# Patient Record
Sex: MALE | Employment: FULL TIME | URBAN - METROPOLITAN AREA
[De-identification: names, ages, dates, MRNs, and addresses within clinical notes are randomized per-mention and may not be internally consistent; named-entity substitution may affect disease eponyms.]

---

## 2023-08-19 ENCOUNTER — OFFICE VISIT (OUTPATIENT)
Dept: PRIMARY CARE | Facility: CLINIC | Age: 28
End: 2023-08-19
Payer: COMMERCIAL

## 2023-08-19 VITALS
BODY MASS INDEX: 27.09 KG/M2 | DIASTOLIC BLOOD PRESSURE: 72 MMHG | WEIGHT: 200 LBS | SYSTOLIC BLOOD PRESSURE: 120 MMHG | HEIGHT: 72 IN

## 2023-08-19 DIAGNOSIS — Z00.00 HEALTH MAINTENANCE EXAMINATION: ICD-10-CM

## 2023-08-19 DIAGNOSIS — R53.83 OTHER FATIGUE: ICD-10-CM

## 2023-08-19 DIAGNOSIS — Z13.220 LIPID SCREENING: ICD-10-CM

## 2023-08-19 DIAGNOSIS — E55.9 VITAMIN D DEFICIENCY: ICD-10-CM

## 2023-08-19 PROCEDURE — 99395 PREV VISIT EST AGE 18-39: CPT | Performed by: INTERNAL MEDICINE

## 2023-08-19 ASSESSMENT — ENCOUNTER SYMPTOMS
OCCASIONAL FEELINGS OF UNSTEADINESS: 0
DEPRESSION: 0
LOSS OF SENSATION IN FEET: 0

## 2023-08-19 NOTE — PROGRESS NOTES
OFFICE NOTE    NAME OF THE PATIENT: Kevin Osborne    YOB: 1995    CHIEF COMPLAINT:  This 28-year-old [place] gentleman today came here for a physical.  He understands it is a covered benefit under insurance.  Otherwise, he will be responsible.  Overall, he enjoys a good health.  He ______ six times a week.  Appetite and weight are okay.  No problem.    PAST MEDICAL HISTORY:  Reviewed on EMR, unchanged.  Not significant.    CURRENT MEDICATIONS:  Reviewed on EMR, unchanged.  None.    ALLERGIES:  Reviewed on EMR, unchanged.  None.    SOCIAL HISTORY:  Reviewed on EMR, unchanged.  He does not smoke and does not drink alcohol.  Occupation, computer expert and businessman.  He is .  No children.    FAMILY HISTORY:  Reviewed on EMR, unchanged.  Mother is diabetic.  Father okay.    IMMUNIZATION:  Tetanus within the last 10 years.    REVIEW OF SYSTEMS:  All 12 systems reviewed and pertaining covered in history and physical.  Regular with dental and eye checkup.    PHYSICAL EXAMINATION  VITAL SIGNS:  As recorded and reviewed from EMR.  EYES:  The patient's sclerae white.  The pupils were equal and round.  ENT:  The patient's external ears were normal and the otoscopic examination was negative.  NECK:  Supple.  There were no palpable masses.  Thyroid was not enlarged and there were no carotid bruits.  RESPIRATORY:  The patient had normal inspirations and expirations.  The breath sounds were equal bilaterally and clear to auscultation.  CARDIOVASCULAR:  The patient had S1 normal, split S2 without obvious rubs, clicks, or murmurs.  GASTROINTESTINAL:  There was no hepatosplenomegaly.  There were no palpable masses and no inguinal nodes.  LYMPHATIC:  The patient had no axillary, groin, or lymphadenopathy.  MUSCULOSKELETAL:  The patient had normal gait.  The joints appeared to be normal without evidence of deformity.  Grossly the muscles had good range of motion and were without effusion.  EXTREMITIES:  There  was no evidence of peripheral edema.  NEUROLOGIC:  The patient had normal cranial nerves.  The reflexes, sensory, and motor examination were grossly within normal limits.  PSYCHIATRIC:  The patient had normal judgment and insight.  The patient was oriented to person, place, and time, and had no obvious mood defect including depression, anxiety, and/or agitation.  RECTAL: Deferred by the patient.  GENITAL: Deferred by the patient.    LAB WORK:  EKG deferred.    ASSESSMENT AND PLAN:  Essentially normal physical.  Blood work ordered.  Family history of diabetes.  Diet and exercise.  I shall see him in a week after test if necessary.  Otherwise, routine check.  Welcome back to my office.      Kindly review this note in conjunction with EMR.   Subjective   Patient ID: Kevin Osborne is a 28 y.o. male who presents for Annual Exam (cpe).      HPI    Review of Systems    Objective   /72   Ht 1.829 m (6')   Wt 90.7 kg (200 lb)   BMI 27.12 kg/m²       Physical Exam    Assessment/Plan   Problem List Items Addressed This Visit    None

## 2025-04-23 ENCOUNTER — APPOINTMENT (OUTPATIENT)
Dept: URGENT CARE | Age: 30
End: 2025-04-23
Payer: COMMERCIAL